# Patient Record
Sex: MALE | Race: OTHER | URBAN - METROPOLITAN AREA
[De-identification: names, ages, dates, MRNs, and addresses within clinical notes are randomized per-mention and may not be internally consistent; named-entity substitution may affect disease eponyms.]

---

## 2017-12-07 ENCOUNTER — HOSPITAL ENCOUNTER (OUTPATIENT)
Dept: LAB | Age: 52
Discharge: HOME | End: 2017-12-07

## 2017-12-07 ENCOUNTER — OFFICE VISIT (OUTPATIENT)
Dept: FAMILY MEDICINE CLINIC | Age: 52
End: 2017-12-07

## 2017-12-07 VITALS
DIASTOLIC BLOOD PRESSURE: 76 MMHG | HEART RATE: 72 BPM | BODY MASS INDEX: 35.26 KG/M2 | SYSTOLIC BLOOD PRESSURE: 148 MMHG | WEIGHT: 199 LBS | HEIGHT: 63 IN

## 2017-12-07 DIAGNOSIS — I10 ESSENTIAL HYPERTENSION, BENIGN: ICD-10-CM

## 2017-12-07 DIAGNOSIS — E66.3 OVERWEIGHT: ICD-10-CM

## 2017-12-07 DIAGNOSIS — E66.3 OVERWEIGHT: Primary | ICD-10-CM

## 2017-12-07 LAB
ALBUMIN SERPL-MCNC: 4 G/DL (ref 3.5–5)
ALBUMIN/GLOB SERPL: 1 {RATIO} (ref 1.1–2.2)
ALP SERPL-CCNC: 114 U/L (ref 45–117)
ALT SERPL-CCNC: 28 U/L (ref 12–78)
ANION GAP SERPL CALC-SCNC: 7 MMOL/L (ref 5–15)
AST SERPL-CCNC: 22 U/L (ref 15–37)
BILIRUB SERPL-MCNC: 0.7 MG/DL (ref 0.2–1)
BUN SERPL-MCNC: 14 MG/DL (ref 6–20)
BUN/CREAT SERPL: 14 (ref 12–20)
CALCIUM SERPL-MCNC: 9.1 MG/DL (ref 8.5–10.1)
CHLORIDE SERPL-SCNC: 100 MMOL/L (ref 97–108)
CHOLEST SERPL-MCNC: 141 MG/DL
CO2 SERPL-SCNC: 31 MMOL/L (ref 21–32)
CREAT SERPL-MCNC: 1 MG/DL (ref 0.7–1.3)
EST. AVERAGE GLUCOSE BLD GHB EST-MCNC: 120 MG/DL
GLOBULIN SER CALC-MCNC: 4.2 G/DL (ref 2–4)
GLUCOSE SERPL-MCNC: 97 MG/DL (ref 65–100)
HBA1C MFR BLD: 5.8 % (ref 4.2–6.3)
HDLC SERPL-MCNC: 48 MG/DL
HDLC SERPL: 2.9 {RATIO} (ref 0–5)
LDLC SERPL CALC-MCNC: 72 MG/DL (ref 0–100)
LIPID PROFILE,FLP: NORMAL
POTASSIUM SERPL-SCNC: 4.1 MMOL/L (ref 3.5–5.1)
PROT SERPL-MCNC: 8.2 G/DL (ref 6.4–8.2)
SODIUM SERPL-SCNC: 138 MMOL/L (ref 136–145)
TRIGL SERPL-MCNC: 105 MG/DL (ref ?–150)
VLDLC SERPL CALC-MCNC: 21 MG/DL

## 2017-12-07 PROCEDURE — 80061 LIPID PANEL: CPT | Performed by: PHYSICIAN ASSISTANT

## 2017-12-07 PROCEDURE — 83036 HEMOGLOBIN GLYCOSYLATED A1C: CPT | Performed by: PHYSICIAN ASSISTANT

## 2017-12-07 PROCEDURE — 80053 COMPREHEN METABOLIC PANEL: CPT | Performed by: PHYSICIAN ASSISTANT

## 2017-12-07 RX ORDER — LISINOPRIL AND HYDROCHLOROTHIAZIDE 10; 12.5 MG/1; MG/1
1 TABLET ORAL DAILY
Qty: 90 TAB | Refills: 3 | Status: SHIPPED | OUTPATIENT
Start: 2017-12-07 | End: 2019-06-20 | Stop reason: SDUPTHER

## 2017-12-07 NOTE — PROGRESS NOTES
Coordination of Care  1. Have you been to the ER, urgent care clinic since your last visit? Hospitalized since your last visit? No    2. Have you seen or consulted any other health care providers outside of the [address] [place] [address] since your last visit? Include any pap smears or colon screening. No    Medications  Does the patient need refills? YES    Learning Assessment Complete?  yes

## 2017-12-07 NOTE — PROGRESS NOTES
[address]  Reviewed escribed rx for Prinzide. Confirmed pharmacy of choice. Labs drawn today - advised that any abnormal results will be addressed by CBN/provider via telephone. AVS printed, reviewed and provided to patient. Communicated via , Cole Smith. Expressed understanding of above stated items and had no further questions. Ivan Kirk RN

## 2017-12-07 NOTE — PATIENT INSTRUCTIONS
Dieta DASH: Instrucciones de cuidado - [ DASH Diet: Care Instructions ]  Instrucciones de cuidado    La dieta DASH es un plan de alimentación que puede ayudar a bajar la presión arterial. DASH es la sigla en inglés de \"Dietary Approaches to Stop Hypertension\" (medidas dietéticas para disminuir la hipertensión). Hipertensión significa presión arterial veronica. La dieta DASH se concentra en el consumo de alimentos con alto contenido de calcio, potasio y [place]. Estos nutrientes pueden disminuir la presión arterial. Los alimentos con el mayor contenido de [place] nutrientes son las frutas, las verduras, los productos lácteos de bajo contenido de [place], las nueces, las semillas y las legumbres. Jayla irma suplementos de calcio, potasio y magnesio, en lugar de comer alimentos ricos en estos nutrientes, no tiene el mismo efecto. La dieta DASH también incluye granos integrales, pescado y aves. La dieta DASH es ashley de los cambios de estilo de belkis que quizá le recomiende dey médico para reducir la presión arterial veronica. Es posible que dey médico también quiera que usted reduzca la cantidad de sodio en dey Meliza Brigido. Reducir el sodio mientras sigue la dieta DASH puede bajar la presión arterial incluso más que únicamente con la dieta DASH. La atención de seguimiento es verona parte clave de dey tratamiento y seguridad. Asegúrese de hacer y acudir a todas las citas, y llame a dey médico si está teniendo problemas. También es verona buena idea saber los resultados de los exámenes y mantener verona lista de los medicamentos que ren. ¿Cómo puede cuidarse en el hogar? Cómo seguir la dieta DASH  · Coma entre 4 y 5 porciones de fruta al día. Ver[place] porción incluye 1 fruta de [place], ½ taza de fruta enlatada o cortada en trozos, 1/4 taza de fruta seca o 4 onzas (½ taza) de jugo de frutas. Elija fruta con más frecuencia que jugo. · Consuma entre 4 y 11 porciones de verduras al día.  Verona porción incluye 1 taza de [address] verduras de hojas crudas, ½ taza de verduras cocidas o cortadas en trozos, o 4 onzas (½ taza) de jugo de verduras. Elija comer verduras con más frecuencia que irma dey jugo. · Consuma entre 2 y 3 porciones de lácteos semidescremados y descremados al día. Verona porción incluye 8 onzas de [place], 1 taza de yogur o 1½ onzas de [place]. · Coma entre 6 y 6 porciones de granos todos los [address] [place] porción incluye 1 rebanada de pan, 1 onza de cereal seco, o ½ taza de arroz, pasta o cereal cocido. Trate de elegir productos de grano integral con la mayor frecuencia posible. · Limite la cantidad de [place] aves y pescado a 2 porciones al día. [place] porción son 3 onzas, lo que es aproximadamente el tamaño de un jayme de naipes. · Coma entre 4 y 5 porciones de nueces, semillas y legumbres (frijoles [habichuelas], lentejas y arvejas [chícharos] secos y cocidos) a la semana. [place] porción incluye 1/3 taza de nueces, 2 cucharadas de semillas o ½ taza de frijoles o arvejas cocidos. · [address] y aceites a 2 o 3 porciones al día. [place] porción es 1 cucharadita de aceite vegetal o 2 cucharadas de aderezo para ensaladas. · Limite los dulces y el azúcar adicional a 5 porciones o menos por semana. [place] porción es 1 cucharada de Kevin o [place], ½ taza de sorbete o 1 taza de limonada. · Consuma menos de 2,300 miligramos (mg) de sodio al día. Si limita dey consumo de sodio a 1,500 mg al día, puede reducir dey presión arterial aún más. Consejos para tener éxito  · Inicie con cambios pequeños. No intente hacer cambios radicales en dey dieta de manera repentina. Podría sentir que extraña salvador comidas favoritas y, por lo [place], babita verona mayor probabilidad de que no cumpla el plan. Sabiha Valdez. Ashley Bergese que esos cambios se conviertan en un hábito, incorpore algunos [employer]. · Pruebe algo de lo siguiente:  ¨ Fíjese el objetivo de comer verona porción de fruta o de verduras en todas las comidas y los refrigerios.  [place] facilitará alcanzar la cantidad diaria recomendada de frutas y verduras. ¨ Pruebe comer yogur cubierto con frutas frescas y nueces marcos refrigerio o marcos postre saludable. ¨ Agregue irene, tomate, pepino y cebolla a salvador sándwiches. ¨ Combine verona masa de pizza precocida con queso mozzarella de bajo contenido de grasa (\"low-fat\") y cúbralo con abundantes verduras. Intente utilizar tomates, calabaza, espinaca, brócoli, zanahorias, coliflor y cebollas. ¨ Opte por comer verona variedad de verduras cortadas en trozos con un aderezo de bajo contenido de grasa marcos refrigerio, en lugar de \"chips\" (tipo quinton fritas) y un \"dip\" (producto untable). ¨ [place] semillas de girasol o almendras picadas [employer]. O intente agregar nueces o almendras picadas a las verduras cocidas. ¨ Pruebe algunas comidas vegetarianas con frijoles y arvejas. Galileo Jaime o frijoles rojos a las ensaladas. Prepare burritos y tacos con puré de frijoles pintos o negros. ¿Dónde puede encontrar más información en inglés? Elisabet He a http://adrien-mason.info/. Deann Reddy U226 en la búsqueda para aprender más acerca de \"Dieta DASH: Instrucciones de cuidado - [ DASH Diet: Care Instructions ]. \"  Revisado: 21 septiembre, 2016  Versión del contenido: 11.4  © 8848-6017 Healthwise, Incorporated. Las instrucciones de cuidado fueron adaptadas bajo licencia por Good Help [employer]ctions (which disclaims liability or warranty for this information). Si usted tiene [place] afección médica o sobre estas instrucciones, siempre pregunte a dey profesional de santa. [facility] niega toda garantía o responsabilidad por dey uso de esta información.

## 2017-12-07 NOTE — MR AVS SNAPSHOT
Visit Information Galileo Elder Personal Médico Departamento Teléfono del Dep. Número de visita 12/7/2017  8:45 AM Mirian Jessica [address], SHAYLEE Jurado [phone] Follow-up Instructions Return in about 6 months (around 6/7/2018). Upcoming Health Maintenance Date Due DTaP/Tdap/Td series (1 - Tdap) 9/6/1987 FOBT Q 1 YEAR AGE 50-75 9/6/2016 Alergias  Review Complete El: 12/7/2017 Por: MIAN Cortez del:  12/7/2017 Intensidad Anotado Tipo de reacción [employer] [Penicillins]  01/24/2013    Unknown (comments) Pt did not know reaction, only saw on records from [place] country. Vacunas actuales Revisadas el:  2/4/2013 No hay ninguna vacuna archivada. No revisadas esta visita You Were Diagnosed With   
  
 Wilfrid Beard Overweight    -  Primary ICD-10-CM: U81.3 ICD-9-CM: 278.02 Essential hypertension, benign     ICD-10-CM: I10 
ICD-9-CM: 401.1 Partes vitales PS Pulso [place] ( percentil de crecimiento) Peso (percentil de crecimiento) BMI [place] Estatus de tabaquísmo 148/76 (BP 1 Location: Right arm) 72 5' 2.99\" (1.6 m) 199 lb (90.3 kg) 35.26 kg/m2 Never Smoker Historial de signos vitales BMI and BSA Data Body Mass Index Body Surface Area  
 35.26 kg/m 2 2 m 2 [facility] Name Phone [facility], [address] [place] [address] Calles lista de medicamentos actualizada Lista actualizada el: 12/7/17  9:56 AM.  Edmond Melo use calles lista de medicamentos más reciente. lisinopril-hydroCHLOROthiazide 10-12.5 mg per tablet También conocido marcos:  Mikhail Jamison Take 1 Tab by mouth daily. loratadine 10 mg tablet También conocido marcos:  Rogelio Chaves Take 1 Tab by mouth daily. Recetas Enviado a la Luisa Refills lisinopril-hydroCHLOROthiazide (PRINZIDE, ZESTORETIC) 10-12.5 mg per tablet 3 Sig: Take 1 Tab by mouth daily. Class: Normal  
 Pharmacy: [facility] [address], [address] #: [phone] Route: Oral  
  
Instrucciones de seguimiento Return in about 6 months (around 6/7/2018). Instrucciones para el Paciente Dieta DASH: Instrucciones de cuidado - [ DASH Diet: Care Instructions ] Instrucciones de cuidado La dieta DASH es un plan de alimentación que puede ayudar a bajar la presión arterial. DASH es la sigla en inglés de \"Dietary Approaches to Stop Hypertension\" (medidas dietéticas para disminuir la hipertensión). Hipertensión significa presión arterial veronica. La dieta DASH se concentra en el consumo de alimentos con alto contenido de calcio, potasio y [place]. Estos nutrientes pueden disminuir la presión arterial. Los alimentos con el mayor contenido de [place] nutrientes son las frutas, las verduras, los productos lácteos de bajo contenido de [place], las nueces, las semillas y las legumbres. Jayla irma suplementos de calcio, potasio y magnesio, en lugar de comer alimentos ricos en estos nutrientes, no tiene el mismo efecto. La dieta DASH también incluye granos integrales, pescado y aves. La dieta DASH es ashley de los cambios de estilo de belkis que quizá le recomiende dey médico para reducir la presión arterial veronica. Es posible que dey médico también quiera que usted reduzca la cantidad de sodio en dey Edwige Makos. Reducir el sodio mientras sigue la dieta DASH puede bajar la presión arterial incluso más que únicamente con la dieta DASH. La atención de seguimiento es verona parte clave de dey tratamiento y seguridad. Asegúrese de hacer y acudir a todas las citas, y llame a dey médico si está teniendo problemas. También es verona buena idea saber los resultados de los exámenes y mantener verona lista de los medicamentos que ren. Cómo puede cuidarse en el hogar? Cómo seguir la dieta DASH 
· Coma entre 4 y 5 porciones de fruta al día. [place] porción incluye 1 fruta de [place], ½ taza de fruta enlatada o cortada en trozos, 1/4 taza de fruta seca o 4 onzas (½ taza) de jugo de frutas. Elija fruta con más frecuencia que jugo. · Consuma entre 4 y 11 porciones de verduras al día. [place] porción incluye 1 taza de [place] o de verduras de hojas crudas, ½ taza de verduras cocidas o cortadas en trozos, o 4 onzas (½ taza) de jugo de verduras. Elija comer verduras con más frecuencia que irma dey jugo. · Consuma entre 2 y 3 porciones de lácteos semidescremados y descremados al día. [place] porción incluye 8 onzas de [place], 1 taza de yogur o 1½ onzas de [place]-barre. · Coma entre 6 y 6 porciones de granos todos los [address] [place] porción incluye 1 rebanada de pan, 1 onza de cereal seco, o ½ taza de arroz, pasta o cereal cocido. Trate de elegir productos de grano integral con la mayor frecuencia posible. · Limite la cantidad de [place]), aves y pescado a 2 porciones al día. Moy Adelso porción son 3 onzas, lo que es aproximadamente el tamaño de un jayme de naipes. · Coma entre 4 y 5 porciones de nueces, semillas y legumbres (frijoles [habichuelas], lentejas y arvejas [chícharos] secos y cocidos) a la semana. [place] porción incluye 1/3 taza de nueces, 2 cucharadas de semillas o ½ taza de frijoles o arvejas cocidos. · [address] [facility] y aceites a 2 o 3 porciones al día. [place] porción es 1 cucharadita de aceite vegetal o 2 cucharadas de aderezo para ensaladas. · Limite los dulces y el azúcar adicional a 5 porciones o menos por semana. Moy Adelso porción es 1 cucharada de Kevin o [place], ½ taza de sorbete o 1 taza de limonada. · Consuma menos de 2,300 miligramos (mg) de sodio al día. Si limita dey consumo de sodio a 1,500 mg al día, puede reducir dey presión arterial aún más. Consejos para tener éxito · Inicie con cambios pequeños.  No intente hacer cambios radicales en dey dieta de manera repentina. Podría sentir que extraña salvador comidas favoritas y, por lo [place], babita verona mayor probabilidad de que no cumpla el plan. Nathaniel Lagunas y Rema. Alexandra Rhodes que esos cambios se conviertan en un hábito, incorpore algunos [employer]. · Pruebe algo de lo siguiente: 
¨ Fíjese el objetivo de comer verona porción de fruta o de verduras en todas las comidas y los refrigerios. [place] facilitará alcanzar la cantidad diaria recomendada de frutas y verduras. ¨ Pruebe comer yogur cubierto con frutas frescas y nueces marcos refrigerio o marcos postre saludable. ¨ Agregue irene, tomate, pepino y cebolla a salvador sándwiches. ¨ Combine verona masa de pizza precocida con queso mozzarella de bajo contenido de grasa (\"low-fat\") y cúbralo con abundantes verduras. Intente utilizar tomates, calabaza, espinaca, brócoli, zanahorias, coliflor y cebollas. ¨ Opte por comer verona variedad de verduras cortadas en trozos con un aderezo de bajo contenido de grasa marcos refrigerio, en lugar de \"chips\" (tipo quinton fritas) y un \"dip\" (producto untable). ¨ [place]voree semillas de girasol o almendras picadas [employer]. O intente agregar nueces o almendras picadas a las verduras cocidas. ¨ Pruebe algunas comidas vegetarianas con frijoles y arvejas. Yelena Blizzard o frijoles rojos a las ensaladas. Prepare burritos y tacos con puré de frijoles pintos o negros. Dónde puede encontrar más información en inglés? Sulma Herrera a http://edna.info/. Kirti Adame U755 en la búsqueda para aprender más acerca de \"Dieta DASH: Instrucciones de cuidado - [ DASH Diet: Care Instructions ]. \" 
Revisado: 21 septiembre, 2016 Versión del contenido: 11.4 © 3794-9977 Healthwise, [employer]. Las instrucciones de cuidado fueron adaptadas bajo licencia por Good Help Connections (which disclaims liability or warranty for this information).  Si usted tiene preguntas sobre verona afección médica o sobre estas instrucciones, siempre pregunte a dey profesional de santa. [facility] niega toda garantía o responsabilidad por dey uso de esta información. Introducing [facility] SERVICES! [facility] introduce portal paciente MyChart . Ahora se puede acceder a partes de dey expediente médico, enviar por correo electrónico la oficina de dey médico y solicitar renovaciones de medicamentos en línea. En dey navegador de Internet , Bert [place] a [email or web address] Harpal clic en el usuario por Cleve Hodgkin? Monisha whitney aquí en la sesión [facility]. Verá la página de registro [place]. Ingrese dey código de Bank  Shari jerry y marcos aparece a continuación. Usted no tendrá que [employer] código después de babita completado el proceso de registro . Si usted no se inscribe antes de la fecha de caducidad , debe solicitar un nuevo código. · MyChart Código de acceso : [id number] Expires: 3/7/2018  9:56 AM 
 
Ingresa los últimos cuatro dígitos de dey Número de Seguro Social ( xxxx ) y fecha de nacimiento ( dd / mm / aaaa ) marcos se indica y harpal clic en Enviar. Usted será llevado a la siguiente página de registro . Crear un ID MyChart . Esta será dey ID de inicio de sesión de MyChart y no puede ser [place] , por lo que pensar en verona que es Kathlynn Civil y fácil de recordar . Crear verona contraseña MyChart . Usted puede cambiar dey contraseña en cualquier momento . Ingrese dey Password Reset de preguntas y [place] . [place] se puede utilizar en un momento posterior si usted olvida dey contraseña. Introduzca dey dirección de correo electrónico . Greg [place] recibirá verona notificación por correo electrónico cuando la nueva información está disponible en MyChart . Kelsey Cola clic en Registrarse. Floydene Marychuy lopez y descargar porciones de dey expediente médico. 
Harpal clic en el enlace de descarga del menú Resumen para descargar verona copia portátil de dey información médica . Si tiene [employer] , por favor visite la sección de preguntas frecuentes del sitio web MyChart . Recuerde, MyChart NO es que se utilizará para las necesidades urgentes. Para emergencias médicas , llame al 911 . Ahora disponible en dey iPhone y Android ! Por favor proporcione keke resumen de la documentación de cuidado a dey próximo proveedor. If you have any questions after today's visit, please call [phone].

## 2017-12-07 NOTE — PROGRESS NOTES
Assessment/Plan:    Diagnoses and all orders for this visit:    1. Overweight  -     HEMOGLOBIN A1C WITH EAG; Future  -     LIPID PANEL; Future    2. Essential hypertension, benign  -     lisinopril-hydroCHLOROthiazide (PRINZIDE, ZESTORETIC) 10-12.5 mg per tablet; Take 1 Tab by mouth daily.  -     METABOLIC PANEL, COMPREHENSIVE; Future    He had flu shot at work end of 11/17  He asks for 1 years worth of meds  Home bp monitor 120-130/80 each time he checks it  Will follow for labs    Follow-up Disposition:  Return in about 6 months (around 6/7/2018). YONNY Garrison Roque Grandchild expressed understanding of this plan. An AVS was printed and given to the patient.      ----------------------------------------------------------------------    Chief Complaint   Patient presents with    Hypertension     needs medication refill       History of Present Illness:   pt here for refill on his bp med. He took his last pill today. His home bp monitor records 120-130/80 each time, never higher he states  He specifically denies chest pain, SOB, GORDILLO. He feels \"well\" and has had a great year. He is fasting today for labs. Past Medical History:   Diagnosis Date    Hypertension     Stroke ([facility] [address] 09/04/2010    Left thalamic hemorrhagic infarct       Current Outpatient Prescriptions   Medication Sig Dispense Refill    lisinopril-hydroCHLOROthiazide (PRINZIDE, ZESTORETIC) 10-12.5 mg per tablet Take 1 Tab by mouth daily. 90 Tab 3    tadalafil (CIALIS) 5 mg tablet Take 1 Tab by mouth as needed. 10 Tab 1    loratadine (CLARITIN) 10 mg tablet Take 1 Tab by mouth daily. 30 Tab 2       Allergies   Allergen Reactions    Pcn [Penicillins] Unknown (comments)     Pt did not know reaction, only saw on records from [place]. Social History   Substance Use Topics    Smoking status: Never Smoker    Smokeless tobacco: Never Used    Alcohol use No       No family history on file.     Physical Exam: Visit Vitals    /76 (BP 1 Location: Right arm)    Pulse 72    Ht 5' 2.99\" (1.6 m)    Wt 199 lb (90.3 kg)    BMI 35.26 kg/m2       A&Ox3  WDWN NAD  Respirations normal and non labored

## 2019-06-20 ENCOUNTER — CLINICAL SUPPORT (OUTPATIENT)
Dept: FAMILY MEDICINE CLINIC | Age: 54
End: 2019-06-20

## 2019-06-20 DIAGNOSIS — Z71.9 COUNSELED BY NURSE: Primary | ICD-10-CM

## 2019-06-20 DIAGNOSIS — I10 ESSENTIAL HYPERTENSION, BENIGN: ICD-10-CM

## 2019-06-20 DIAGNOSIS — Z79.899 MEDICATION MANAGEMENT: ICD-10-CM

## 2019-06-20 NOTE — TELEPHONE ENCOUNTER
Patient walk in at our [facility] requesting a medication refill for HCTZ-Lisinopril. Patient did not make the line this morning came at 0500. Chart reviewed and discussed with provider on site Saundra JUNE. Nurse received order from provider to place Rx in system for Lisinopril-HCTZ 10-12.5mg 1 tablet by mouth once a day. #30 with 1 refill ans sent back to her to electronically signed. Discussed with patient that the provider has given him 30 days of medication with 1 refill but he has to be seen, the provider  Will not refill medication again if he does not get seen within that time frame. CVAN calendar given and instructed on coming on less busy site days.

## 2019-06-21 RX ORDER — LISINOPRIL AND HYDROCHLOROTHIAZIDE 10; 12.5 MG/1; MG/1
1 TABLET ORAL DAILY
Qty: 30 TAB | Refills: 1 | Status: SHIPPED | OUTPATIENT
Start: 2019-06-21